# Patient Record
Sex: MALE | Race: WHITE | NOT HISPANIC OR LATINO | Employment: FULL TIME | URBAN - METROPOLITAN AREA
[De-identification: names, ages, dates, MRNs, and addresses within clinical notes are randomized per-mention and may not be internally consistent; named-entity substitution may affect disease eponyms.]

---

## 2017-02-08 ENCOUNTER — NON-PROVIDER VISIT (OUTPATIENT)
Dept: URGENT CARE | Facility: CLINIC | Age: 42
End: 2017-02-08

## 2017-02-08 DIAGNOSIS — Z02.1 PRE-EMPLOYMENT DRUG SCREENING: ICD-10-CM

## 2017-02-08 LAB
AMP AMPHETAMINE: NORMAL
COC COCAINE: NORMAL
INT CON NEG: NORMAL
INT CON POS: NORMAL
MET METHAMPHETAMINES: NORMAL
OPI OPIATES: NORMAL
PCP PHENCYCLIDINE: NORMAL
POC DRUG COMMENT 753798-OCCUPATIONAL HEALTH: NEGATIVE
THC: NORMAL

## 2017-02-08 PROCEDURE — 80305 DRUG TEST PRSMV DIR OPT OBS: CPT | Performed by: PHYSICIAN ASSISTANT

## 2018-03-07 ENCOUNTER — HOSPITAL ENCOUNTER (OUTPATIENT)
Dept: RADIOLOGY | Facility: MEDICAL CENTER | Age: 43
End: 2018-03-07
Attending: FAMILY MEDICINE
Payer: COMMERCIAL

## 2018-03-07 DIAGNOSIS — R51.9 NONINTRACTABLE HEADACHE, UNSPECIFIED CHRONICITY PATTERN, UNSPECIFIED HEADACHE TYPE: ICD-10-CM

## 2018-03-07 PROCEDURE — 70551 MRI BRAIN STEM W/O DYE: CPT

## 2018-07-13 ENCOUNTER — APPOINTMENT (OUTPATIENT)
Dept: ADMISSIONS | Facility: MEDICAL CENTER | Age: 43
End: 2018-07-13
Attending: OTOLARYNGOLOGY
Payer: COMMERCIAL

## 2018-07-13 ENCOUNTER — HOSPITAL ENCOUNTER (OUTPATIENT)
Dept: RADIOLOGY | Facility: MEDICAL CENTER | Age: 43
End: 2018-07-13

## 2018-07-13 RX ORDER — AMOXICILLIN AND CLAVULANATE POTASSIUM 875; 125 MG/1; MG/1
1 TABLET, FILM COATED ORAL 2 TIMES DAILY
COMMUNITY

## 2018-07-13 RX ORDER — ACETAMINOPHEN 325 MG/1
325 TABLET ORAL EVERY 6 HOURS PRN
COMMUNITY

## 2018-07-17 ENCOUNTER — HOSPITAL ENCOUNTER (OUTPATIENT)
Facility: MEDICAL CENTER | Age: 43
End: 2018-07-17
Attending: OTOLARYNGOLOGY | Admitting: OTOLARYNGOLOGY
Payer: COMMERCIAL

## 2018-07-17 VITALS
BODY MASS INDEX: 27.9 KG/M2 | HEART RATE: 56 BPM | OXYGEN SATURATION: 96 % | RESPIRATION RATE: 16 BRPM | TEMPERATURE: 97.8 F | HEIGHT: 71 IN | WEIGHT: 199.3 LBS

## 2018-07-17 DIAGNOSIS — G89.18 ACUTE POST-OPERATIVE PAIN: ICD-10-CM

## 2018-07-17 PROCEDURE — 160009 HCHG ANES TIME/MIN: Performed by: OTOLARYNGOLOGY

## 2018-07-17 PROCEDURE — 88311 DECALCIFY TISSUE: CPT

## 2018-07-17 PROCEDURE — 160041 HCHG SURGERY MINUTES - EA ADDL 1 MIN LEVEL 4: Performed by: OTOLARYNGOLOGY

## 2018-07-17 PROCEDURE — 700111 HCHG RX REV CODE 636 W/ 250 OVERRIDE (IP)

## 2018-07-17 PROCEDURE — 501838 HCHG SUTURE GENERAL: Performed by: OTOLARYNGOLOGY

## 2018-07-17 PROCEDURE — 700101 HCHG RX REV CODE 250

## 2018-07-17 PROCEDURE — 160029 HCHG SURGERY MINUTES - 1ST 30 MINS LEVEL 4: Performed by: OTOLARYNGOLOGY

## 2018-07-17 PROCEDURE — 700102 HCHG RX REV CODE 250 W/ 637 OVERRIDE(OP)

## 2018-07-17 PROCEDURE — 502573 HCHG PACK, ENT: Performed by: OTOLARYNGOLOGY

## 2018-07-17 PROCEDURE — A9270 NON-COVERED ITEM OR SERVICE: HCPCS

## 2018-07-17 PROCEDURE — 160048 HCHG OR STATISTICAL LEVEL 1-5: Performed by: OTOLARYNGOLOGY

## 2018-07-17 PROCEDURE — 160002 HCHG RECOVERY MINUTES (STAT): Performed by: OTOLARYNGOLOGY

## 2018-07-17 PROCEDURE — 160036 HCHG PACU - EA ADDL 30 MINS PHASE I: Performed by: OTOLARYNGOLOGY

## 2018-07-17 PROCEDURE — 88305 TISSUE EXAM BY PATHOLOGIST: CPT

## 2018-07-17 PROCEDURE — 502692 HCHG DRESSING, NASOPORE 8CM: Performed by: OTOLARYNGOLOGY

## 2018-07-17 PROCEDURE — 110454 HCHG SHELL REV 250: Performed by: OTOLARYNGOLOGY

## 2018-07-17 PROCEDURE — 500331 HCHG COTTONOID, SURG PATTIE: Performed by: OTOLARYNGOLOGY

## 2018-07-17 PROCEDURE — 160035 HCHG PACU - 1ST 60 MINS PHASE I: Performed by: OTOLARYNGOLOGY

## 2018-07-17 RX ORDER — LIDOCAINE HYDROCHLORIDE AND EPINEPHRINE 10; 10 MG/ML; UG/ML
INJECTION, SOLUTION INFILTRATION; PERINEURAL
Status: DISCONTINUED | OUTPATIENT
Start: 2018-07-17 | End: 2018-07-17 | Stop reason: HOSPADM

## 2018-07-17 RX ORDER — AMOXICILLIN AND CLAVULANATE POTASSIUM 875; 125 MG/1; MG/1
1 TABLET, FILM COATED ORAL 2 TIMES DAILY
Qty: 28 TAB | Refills: 0 | Status: SHIPPED | OUTPATIENT
Start: 2018-07-17 | End: 2018-07-31

## 2018-07-17 RX ORDER — SODIUM CHLORIDE, SODIUM LACTATE, POTASSIUM CHLORIDE, CALCIUM CHLORIDE 600; 310; 30; 20 MG/100ML; MG/100ML; MG/100ML; MG/100ML
INJECTION, SOLUTION INTRAVENOUS CONTINUOUS
Status: DISCONTINUED | OUTPATIENT
Start: 2018-07-17 | End: 2018-07-17 | Stop reason: HOSPADM

## 2018-07-17 RX ORDER — OXYCODONE HYDROCHLORIDE 5 MG/1
5 TABLET ORAL EVERY 4 HOURS PRN
Qty: 18 TAB | Refills: 0 | Status: SHIPPED | OUTPATIENT
Start: 2018-07-17 | End: 2018-07-20

## 2018-07-17 RX ORDER — GINSENG 100 MG
CAPSULE ORAL
Status: DISCONTINUED | OUTPATIENT
Start: 2018-07-17 | End: 2018-07-17 | Stop reason: HOSPADM

## 2018-07-17 RX ORDER — OXYMETAZOLINE HYDROCHLORIDE 0.05 G/100ML
SPRAY NASAL
Status: DISCONTINUED | OUTPATIENT
Start: 2018-07-17 | End: 2018-07-17 | Stop reason: HOSPADM

## 2018-07-17 RX ADMIN — SODIUM CHLORIDE, SODIUM LACTATE, POTASSIUM CHLORIDE, CALCIUM CHLORIDE: 600; 310; 30; 20 INJECTION, SOLUTION INTRAVENOUS at 09:25

## 2018-07-17 ASSESSMENT — PAIN SCALES - GENERAL
PAINLEVEL_OUTOF10: 0

## 2018-07-17 NOTE — DISCHARGE INSTRUCTIONS
ACTIVITY: Rest and take it easy for the first 24 hours.  A responsible adult is recommended to remain with you during that time.  It is normal to feel sleepy.  We encourage you to not do anything that requires balance, judgment or coordination.    MILD FLU-LIKE SYMPTOMS ARE NORMAL. YOU MAY EXPERIENCE GENERALIZED MUSCLE ACHES, THROAT IRRITATION, HEADACHE AND/OR SOME NAUSEA.    FOR 24 HOURS DO NOT:  Drive, operate machinery or run household appliances.  Drink beer or alcoholic beverages.   Make important decisions or sign legal documents.    SPECIAL INSTRUCTIONS: Diet as tolerated, no strenuous activity, may shower in 24 h.  Afrin 3 sprays to affected nostril prn heavy bleeding.  Follow up:10 days-please call for appointment.    DIET: To avoid nausea, slowly advance diet as tolerated, avoiding spicy or greasy foods for the first day.  Add more substantial food to your diet according to your physician's instructions.  Babies can be fed formula or breast milk as soon as they are hungry.  INCREASE FLUIDS AND FIBER TO AVOID CONSTIPATION.      FOLLOW-UP APPOINTMENT:  A follow-up appointment should be arranged with your doctor in 10 days; call to schedule.    You should CALL YOUR PHYSICIAN if you develop:  Fever greater than 101 degrees F.  Pain not relieved by medication, or persistent nausea or vomiting.  Excessive bleeding (blood soaking through dressing) or unexpected drainage from the wound.  Extreme redness or swelling around the incision site, drainage of pus or foul smelling drainage.  Inability to urinate or empty your bladder within 8 hours.  Problems with breathing or chest pain.    You should call 911 if you develop problems with breathing or chest pain.  If you are unable to contact your doctor or surgical center, you should go to the nearest emergency room or urgent care center.  Physician's telephone #: Dr. Longoria [phone]    If any questions arise, call your doctor.  If your doctor is not available,  please feel free to call the Surgical Center at [phone].  The Center is open Monday through Friday from 7AM to 7PM.  You can also call the HEALTH HOTLINE open 24 hours/day, 7 days/week and speak to a nurse at [phone], or toll free at [phone].    A registered nurse may call you a few days after your surgery to see how you are doing after your procedure.    MEDICATIONS: Resume taking daily medication.  Take prescribed pain medication with food.  If no medication is prescribed, you may take non-aspirin pain medication if needed.  PAIN MEDICATION CAN BE VERY CONSTIPATING.  Take a stool softener or laxative such as senokot, pericolace, or milk of magnesia if needed.    Prescription given for antibiotic and pain medication.     If your physician has prescribed pain medication that includes Acetaminophen (Tylenol), do not take additional Acetaminophen (Tylenol) while taking the prescribed medication.    Depression / Suicide Risk    As you are discharged from this [facility] facility, it is important to learn how to keep safe from harming yourself.    Recognize the warning signs:  · Abrupt changes in personality, positive or negative- including increase in energy   · Giving away possessions  · Change in eating patterns- significant weight changes-  positive or negative  · Change in sleeping patterns- unable to sleep or sleeping all the time   · Unwillingness or inability to communicate  · Depression  · Unusual sadness, discouragement and loneliness  · Talk of wanting to die  · Neglect of personal appearance   · Rebelliousness- reckless behavior  · Withdrawal from people/activities they love  · Confusion- inability to concentrate     If you or a loved one observes any of these behaviors or has concerns about self-harm, here's what you can do:  · Talk about it- your feelings and reasons for harming yourself  · Remove any means that you might use to hurt yourself (examples: pills, rope, extension cords,  firearm)  · Get professional help from the community (Mental Health, Substance Abuse, psychological counseling)  · Do not be alone:Call your Safe Contact- someone whom you trust who will be there for you.  · Call your local CRISIS HOTLINE 326-3522 or 468-052-4871  · Call your local Children's Mobile Crisis Response Team [place] (340) 678-8232 or www.CareFamily  · Call the toll free National Suicide Prevention Hotlines   · National Suicide Prevention Lifeline 830-499-LATL (6224)  · National Hope Line Network 800-SUICIDE (667-5767)

## 2018-07-17 NOTE — OR SURGEON
Immediate Post OP Note    PreOp Diagnosis: Chronic left ethmoid and maxillary sinusitis    PostOp Diagnosis: same    Procedure(s):  ANTROSTOMY - ENDOSCOPIC MAXILLARY W/MAXILLARY TISSUE REMOVAL - Wound Class: Clean Contaminated  ETHMOIDECTOMY - PARTIAL (ANTERIOR) - Wound Class: Clean Contaminated    Surgeon(s):  Sergio Longoria M.D.    Anesthesiologist/Type of Anesthesia:  Anesthesiologist: Barrington Castillo/General    Surgical Staff:  Circulator: Tessy Rosales R.N.  Relief Circulator: Xuan Mendoza R.N.  Scrub Person: Marium Harden    Specimens removed if any:  * No specimens in log *    Estimated Blood Loss: 100 cc    Findings: pus left maxillary sinus    Complications: none        7/17/2018 12:47 PM Sergio Longoria M.D.

## 2018-07-17 NOTE — OR NURSING
Discharge orders received. Meets criteria, declines pain/nausea. All lines and monitors discontinued. Reviewed discharge paperwork with pt and wife. Discussed diet, activity, medications, follow up care and worsening symptoms. No questions at this time. Pt to be discharged to home via private vehicle. Offered hospital escort and wc, pt declined, ambulated out of department.

## 2018-07-17 NOTE — OR NURSING
Pt to PACU from OR. Report from anesthesia and OR RN. Pt sleeping at this time. Respirations even and unlabored. VSS

## 2018-07-24 NOTE — OP REPORT
DATE OF SERVICE:  07/17/2018    PREOPERATIVE DIAGNOSES:  1.  Chronic left maxillary sinusitis.  2.  Chronic left ethmoid sinusitis.  3.  Left-sided maxillary and ethmoid odontogenic sinusitis.    POSTOPERATIVE DIAGNOSES:  1.  Chronic left maxillary sinusitis.  2.  Chronic left ethmoid sinusitis.  3.  Left-sided maxillary and ethmoid odontogenic sinusitis.    PROCEDURES:  1.  Left endoscopic anterior ethmoidectomy with landmarks.  2.  Left endoscopic maxillary sinus antrostomy with landmark.    INDICATIONS:  The patient is a 42-year-old male who has had a 3-year history   of left sinus issues since extraction of tooth.  He has pain in the left   frontal region and drainage.  A recent MRI done for headaches demonstrated a   completely opacified left maxillary sinus extending into the anterior   ethmoids.  CT scanning confirmed the maxillary and ethmoid process and   dehiscent bone  is maxillary sinus from the oral cavity in the   region of the tooth removal.  The patient has an odontogenic sinusitis and   presents now for surgical treatment.    DESCRIPTION OF PROCEDURE:  The patient was taken to the operating room and   placed in the supine position.  General anesthesia was induced and the patient   easily intubated.  The nasal cavity was packed with Afrin-soaked sponges and   the patient draped in the usual fashion for this type of procedure including   setting up the landmarks tracking system.  The patient was then registered   with excellent accuracy.  The Afrin-soaked sponges were removed.  The patient   had a septal deviation, but it was to the right side and actually access into   the middle meatus was excellent.  He was having no breathing issues, so it was   decided to forego the septoplasty.  A seeker instrument was placed behind the   patient's uncinate process and used to retract it anteriorly.  It was then   removed with upbiting non-serrated forceps and the shaver blade.  The seeker   was used  as well to tease out all of the uncinate bone.  An anterior   ethmoidectomy was then accomplished.  The patient had moderate mucoperiosteal   thickening in the anterior ethmoid.  The procedure was done with landmarks   assessing location and there was no injury to the lamina papyracea or fovea   ethmoidalis.  The resection actually proceeded through the posterior lamella   of the bulla into the posterior ethmoids, but no dissection was done in this   area.  There was pus streaming from the natural ostia of the maxillary sinus.    The area where the pus was coming from was enlarged using side biting and   backbiting forceps.  Purulence was evacuated, but not cultured.  The maxillary   sinus was then vigorously irrigated with saline and the irrigant suctioned.    The middle turbinate appeared to be quite stable.  Surgiflo was placed in the   middle meatus, then a small piece of Nasopore coated in mupirocin.  The oral   cavity was suctioned free of blood and the patient awakened and taken to the   recovery room in stable condition.  He tolerated the procedure well and there   were no complications.       ____________________________________     MD CELIA COTO / JOHN    DD:  07/24/2018 11:05:52  DT:  07/24/2018 11:19:50    D#:  [id number]  Job#:  [id number]    cc: Dr. Philip Gómez

## 2018-10-29 ENCOUNTER — OFFICE VISIT (OUTPATIENT)
Dept: URGENT CARE | Facility: CLINIC | Age: 43
End: 2018-10-29

## 2018-10-29 DIAGNOSIS — Z01.89 RESPIRATORY CLEARANCE EXAMINATION, ENCOUNTER FOR: ICD-10-CM

## 2018-10-29 PROCEDURE — 8916 PR CLEARANCE ONLY: Performed by: NURSE PRACTITIONER

## 2018-10-29 NOTE — PROGRESS NOTES
Present for mask fit and respiratory clearance. Reviewed respiratory clearance.  Patient is cleared for mask.

## 2024-05-12 ENCOUNTER — OFFICE VISIT (OUTPATIENT)
Dept: URGENT CARE | Facility: PHYSICIAN GROUP | Age: 49
End: 2024-05-12
Payer: COMMERCIAL

## 2024-05-12 ENCOUNTER — APPOINTMENT (OUTPATIENT)
Dept: RADIOLOGY | Facility: IMAGING CENTER | Age: 49
End: 2024-05-12
Attending: NURSE PRACTITIONER
Payer: COMMERCIAL

## 2024-05-12 VITALS
HEART RATE: 67 BPM | RESPIRATION RATE: 15 BRPM | SYSTOLIC BLOOD PRESSURE: 132 MMHG | OXYGEN SATURATION: 97 % | TEMPERATURE: 96.4 F | WEIGHT: 200 LBS | BODY MASS INDEX: 28.63 KG/M2 | DIASTOLIC BLOOD PRESSURE: 68 MMHG | HEIGHT: 70 IN

## 2024-05-12 DIAGNOSIS — M25.471 PAIN AND SWELLING OF RIGHT ANKLE: ICD-10-CM

## 2024-05-12 DIAGNOSIS — M25.571 PAIN AND SWELLING OF RIGHT ANKLE: ICD-10-CM

## 2024-05-12 DIAGNOSIS — S93.401A SPRAIN OF RIGHT ANKLE, UNSPECIFIED LIGAMENT, INITIAL ENCOUNTER: ICD-10-CM

## 2024-05-12 PROCEDURE — 3078F DIAST BP <80 MM HG: CPT | Performed by: NURSE PRACTITIONER

## 2024-05-12 PROCEDURE — 99203 OFFICE O/P NEW LOW 30 MIN: CPT | Performed by: NURSE PRACTITIONER

## 2024-05-12 PROCEDURE — 3075F SYST BP GE 130 - 139MM HG: CPT | Performed by: NURSE PRACTITIONER

## 2024-05-12 PROCEDURE — 73610 X-RAY EXAM OF ANKLE: CPT | Mod: TC,FY,RT | Performed by: RADIOLOGY

## 2024-05-12 RX ORDER — ACETAMINOPHEN 500 MG
1000 TABLET ORAL ONCE
Status: COMPLETED | OUTPATIENT
Start: 2024-05-12 | End: 2024-05-12

## 2024-05-12 RX ADMIN — Medication 1000 MG: at 12:47

## 2024-05-12 ASSESSMENT — ENCOUNTER SYMPTOMS
FALLS: 1
SPEECH CHANGE: 0
FOCAL WEAKNESS: 1
TINGLING: 0

## 2024-05-12 NOTE — PROGRESS NOTES
"Subjective:     Ritchie Flores Jr. is a 48 y.o. male who presents for Ankle Injury (Rolled on rock. Right Ankle pain x 1 day)      Ankle Injury   Pertinent negatives include no tingling.     Pt presents for evaluation of a new problem. Francisco is a very pleasant 48-year-old male presents to urgent care today with complaints of right-sided lateral ankle pain that started yesterday after he rolled his ankle on a rock.  Patient denies any injury to the this ankle previously.  His pain does radiate up to left lower leg above ankle.  He denies any numbness or tingling.  He has used ibuprofen for relief of his discomfort.    Review of Systems   Musculoskeletal:  Positive for falls and joint pain.   Neurological:  Positive for focal weakness. Negative for tingling and speech change.       PMH: No past medical history on file.  ALLERGIES: No Known Allergies  SURGHX:   Past Surgical History:   Procedure Laterality Date    ANTROSTOMY Left 7/17/2018    Procedure: ANTROSTOMY - ENDOSCOPIC MAXILLARY W/MAXILLARY TISSUE REMOVAL;  Surgeon: Sergio Longoria M.D.;  Location: SURGERY SAME DAY [facility];  Service: Ent    ETHMOIDECTOMY Left 7/17/2018    Procedure: ETHMOIDECTOMY - PARTIAL (ANTERIOR);  Surgeon: Sergio Longoria M.D.;  Location: SURGERY SAME DAY [facility];  Service: Ent    OTHER ORTHOPEDIC SURGERY  1998    hand surgery     SOCHX:   Social History     Socioeconomic History    Marital status:    Tobacco Use    Smoking status: Never    Smokeless tobacco: Never   Substance and Sexual Activity    Alcohol use: Yes     Comment: 1 drink a week     FH: No family history on file.      Objective:   /68   Pulse 67   Temp (!) 35.8 °C (96.4 °F) (Temporal)   Resp 15   Ht 1.778 m (5' 10\")   Wt 90.7 kg (200 lb)   SpO2 97%   BMI 28.70 kg/m²     Physical Exam  Vitals and nursing note reviewed.   Constitutional:       General: He is not in acute distress.     Appearance: Normal appearance. He is normal weight. He is " not ill-appearing or toxic-appearing.   HENT:      Head: Normocephalic.      Right Ear: External ear normal.      Left Ear: External ear normal.      Nose: Nose normal.      Mouth/Throat:      Mouth: Mucous membranes are moist.   Eyes:      General:         Right eye: No discharge.         Left eye: No discharge.      Extraocular Movements: Extraocular movements intact.      Conjunctiva/sclera: Conjunctivae normal.      Pupils: Pupils are equal, round, and reactive to light.   Pulmonary:      Effort: Pulmonary effort is normal.      Breath sounds: Normal breath sounds.   Abdominal:      General: Abdomen is flat.   Musculoskeletal:         General: Normal range of motion.      Cervical back: Normal range of motion and neck supple. No rigidity.   Lymphadenopathy:      Cervical: No cervical adenopathy.   Skin:     General: Skin is warm and dry.   Neurological:      General: No focal deficit present.      Mental Status: He is alert and oriented to person, place, and time. Mental status is at baseline.   Psychiatric:         Mood and Affect: Mood normal.         Behavior: Behavior normal.         Judgment: Judgment normal.       DX-ANKLE 3+ VIEWS RIGHT    Result Date: 5/12/2024 5/12/2024 12:45 PM HISTORY/REASON FOR EXAM:  Pain/Deformity Following Trauma. Right ankle pain, injury TECHNIQUE/EXAM DESCRIPTION AND NUMBER OF VIEWS:  3 views of the RIGHT ankle. COMPARISON: None. FINDINGS: There are no acute fracture. There is deformity of the distal fibula and adjacent talus suggesting sequela of prior injury. There is also probably an old medial malleolar fracture. There is spurring at the insertion of achilles' tendon and origin of plantar fascia. Alignment is normal. No degenerative changes are present.     1.  Negative for acute fracture 2.  Sequela of prior injury involving the distal fibula, lateral aspect of the talus, probably the medial malleolus 3.  Calcaneal spurring     Assessment/Plan:   Assessment        1. Pain  and swelling of right ankle  DX-ANKLE 3+ VIEWS RIGHT    acetaminophen (Tylenol) tablet 1,000 mg      2. Sprain of right ankle, unspecified ligament, initial encounter          Pt was encouraged to seek treatment back in urgent care for follow-up x-ray if pain remains persistent past 7 days or develops worsening pain/symptoms. Tylenol/Ibuprofen as needed for discomfort.  Pt was encouraged to rest, ice 20 minutes 3 times daily for 3 days, elevate at heart level and compress with Ace wrap. Ace wrap applied in clinic by MA. Gentle ROM exercises encouraged.  Questions/concerns addressed.  AVS handout given and reviewed with patient. Pt educated on red flags and when to seek treatment back in ER or UC.

## 2024-05-17 ENCOUNTER — OFFICE VISIT (OUTPATIENT)
Dept: URGENT CARE | Facility: PHYSICIAN GROUP | Age: 49
End: 2024-05-17
Payer: COMMERCIAL

## 2024-05-17 ENCOUNTER — TELEPHONE (OUTPATIENT)
Dept: URGENT CARE | Facility: PHYSICIAN GROUP | Age: 49
End: 2024-05-17

## 2024-05-17 VITALS
OXYGEN SATURATION: 97 % | TEMPERATURE: 97.4 F | RESPIRATION RATE: 16 BRPM | HEART RATE: 72 BPM | BODY MASS INDEX: 29.63 KG/M2 | DIASTOLIC BLOOD PRESSURE: 84 MMHG | WEIGHT: 207 LBS | SYSTOLIC BLOOD PRESSURE: 138 MMHG | HEIGHT: 70 IN

## 2024-05-17 DIAGNOSIS — S93.401A SPRAIN OF RIGHT ANKLE, UNSPECIFIED LIGAMENT, INITIAL ENCOUNTER: ICD-10-CM

## 2024-05-17 PROCEDURE — 99213 OFFICE O/P EST LOW 20 MIN: CPT

## 2024-05-17 PROCEDURE — 3075F SYST BP GE 130 - 139MM HG: CPT

## 2024-05-17 PROCEDURE — 3079F DIAST BP 80-89 MM HG: CPT

## 2024-05-17 ASSESSMENT — ENCOUNTER SYMPTOMS
NUMBNESS: 0
INABILITY TO BEAR WEIGHT: 0
TINGLING: 0
MUSCLE WEAKNESS: 0
LOSS OF SENSATION: 0
LOSS OF MOTION: 0

## 2024-05-17 NOTE — LETTER
May 17, 2024    To Whom It May Concern:         This is confirmation that Ritchie Snow Sandra Cohen attended his scheduled appointment with ROSY Ferrell on 5/17/24. Please excuse him from work from 5/14/24 - 5/17/24 due to an acute injury. He may return to work on 5/21/24.         If you have any questions please do not hesitate to call me at the phone number listed below.    Sincerely,          Lina Blake A.P.R.N.  [phone]

## 2024-05-17 NOTE — PROGRESS NOTES
Subjective:   Ritchie Flores Jr. is a very pleasant 48 y.o. male who presents for:    Chief Complaint   Patient presents with    Ankle Injury     (R) on 05-11-24.  Pt. Was seen on 05-12-24 and needs a work note.        HPI:    Ankle Injury   Incident onset: the patient sustained an ankle injury on 5/11/24.  He was seen in the urgent care and diagnosed with a right ankle sprain.  Negative x-rays. The quality of the pain is described as aching. Pain course: His pain is improving with supportive measures.  He is able to bear weight on the affected foot and has mild pain on the lateral aspect of the ankle with movement. Pertinent negatives include no inability to bear weight, loss of motion, loss of sensation, muscle weakness, numbness or tingling. He has tried ice and elevation (Tylenol and ibuprofen, ankle brace) for the symptoms. The treatment provided moderate relief.     He would like a work note excusing him from work for the past week due to his injury.    ROS:    Review of Systems   Musculoskeletal:         Right ankle injury   Neurological:  Negative for tingling and numbness.   All other systems reviewed and are negative.      Medications:      Current Outpatient Medications   Medication Sig    amoxicillin-clavulanate (AUGMENTIN) 875-125 MG Tab Take 1 Tab by mouth 2 times a day. (Patient not taking: Reported on 5/12/2024)    acetaminophen (TYLENOL) 325 MG Tab Take 325 mg by mouth every 6 hours as needed. (Patient not taking: Reported on 5/12/2024)       Allergies:     No Known Allergies    Problem List:     There is no problem list on file for this patient.      Surgical History:    Past Surgical History:   Procedure Laterality Date    ANTROSTOMY Left 7/17/2018    Procedure: ANTROSTOMY - ENDOSCOPIC MAXILLARY W/MAXILLARY TISSUE REMOVAL;  Surgeon: Sergio Longoria M.D.;  Location: SURGERY SAME DAY [facility];  Service: Ent    ETHMOIDECTOMY Left 7/17/2018    Procedure: ETHMOIDECTOMY - PARTIAL (ANTERIOR);   Surgeon: Sergio Longoria M.D.;  Location: SURGERY SAME DAY [facility];  Service: Ent    OTHER ORTHOPEDIC SURGERY  1998    hand surgery       Past Social Hx:     Social History     Socioeconomic History    Marital status:    Tobacco Use    Smoking status: Never    Smokeless tobacco: Never   Substance and Sexual Activity    Alcohol use: Yes     Comment: 1 drink a week        Past Family Hx:      History reviewed. No pertinent family history.    Problem list, medications, and allergies reviewed by myself today in Epic.     Objective:     Vitals:    05/17/24 0916   BP: 138/84   Pulse: 72   Resp: 16   Temp: 36.3 °C (97.4 °F)   SpO2: 97%       Physical Exam  Vitals reviewed.   Constitutional:       General: He is not in acute distress.     Appearance: Normal appearance. He is not ill-appearing, toxic-appearing or diaphoretic.   HENT:      Head: Normocephalic and atraumatic.   Eyes:      Extraocular Movements: Extraocular movements intact.      Pupils: Pupils are equal, round, and reactive to light.   Cardiovascular:      Pulses: Normal pulses.      Heart sounds: Normal heart sounds.   Pulmonary:      Effort: Pulmonary effort is normal.   Musculoskeletal:         General: Normal range of motion.      Cervical back: Normal range of motion.        Feet:    Feet:      Comments: Right ankle/foot:  Appearance: No bruising, erythema, or deformity appreciated  Palpation: Mild TTP over the lateral malleolus and the anterior lateral aspect of the right ankle proximal to the lateral malleolus.  No TTP along medial malleolus or deltoid ligament.  ATFL, CFL, or PTFL.  No TTP along midfoot, base of the 5th metatarsal, MTP joints, or toes.   ROM: FROM throughout  Strength: 5/5 throughout  Neurovascular: 2+ dorsalis pedis and posterior tibial.  Sensation intact and equal bilaterally  Gait: Nonantalgic, neutral stance without pes planus     Skin:     General: Skin is warm and dry.   Neurological:      General: No focal deficit  present.      Mental Status: He is alert and oriented to person, place, and time. Mental status is at baseline.   Psychiatric:         Mood and Affect: Mood normal.         Behavior: Behavior normal.         Thought Content: Thought content normal.         Judgment: Judgment normal.                 Assessment/Plan:     Diagnosis and associated orders:     1. Sprain of right ankle, unspecified ligament, initial encounter          Comments/MDM:     Presentation consistent with sprain of the right ankle.  His symptoms are improving with the use of an ankle brace, home physical therapy, Tylenol/ibuprofen, elevation, and ice.  X-rays completed in urgent care setting at previous appointment negative for osseous abnormality  Discussed return to work precautions.  Work note provided.  He was given an Ace wrap and educated on how to wrap his ankle.  Supportive measures reviewed at length regarding rehabilitation of an ankle sprain.  Return precautions discussed         All questions answered. Patient verbalized understanding and is in agreement with this plan of care.     If symptoms are worsening or not improving in 3-5 days, follow-up with PCP or return to UC. Differential diagnosis, natural history, and supportive care discussed. AVS handout given and reviewed with patient. Patient educated on red flags and when to seek treatment back in ED or UC.     I personally reviewed prior external notes and test results pertinent to today's visit.  I have independently reviewed and interpreted all diagnostics ordered during this urgent care visit.     This dictation has been created using voice recognition software. The accuracy of the dictation is limited by the abilities of the software. I expect there may be some errors of grammar and possibly content. I made every attempt to manually correct the errors within my dictation. However, errors related to voice recognition software may still exist and should be interpreted within the  appropriate context.    This note was electronically signed by ROSY Sumner

## (undated) DEVICE — PATTIES SURG X-RAYCOTTONOID - 1/2 X 3 IN (200/CA)

## (undated) DEVICE — NEEDLE SPINAL NON-SAFETY 25GA X 3 (25EA/BX)"

## (undated) DEVICE — GLOVE BIOGEL PI INDICATOR SZ 6.5 SURGICAL PF LF - (50/BX 4BX/CA)

## (undated) DEVICE — SODIUM CHL IRRIGATION 0.9% 1000ML (12EA/CA)

## (undated) DEVICE — SUTURE GENERAL

## (undated) DEVICE — ANTI-FOG SOLUTION - 60BTL/CA

## (undated) DEVICE — GOWN WARMING STANDARD FLEX - (30/CA)

## (undated) DEVICE — DRESSING NASOPORE 8CM STD - (8EA/PK)

## (undated) DEVICE — TUBE E-T HI-LO CUFF 7.0MM (10EA/PK)

## (undated) DEVICE — TRACKER ENT PATIENT

## (undated) DEVICE — GLOVE BIOGEL SZ 7.5 SURGICAL PF LTX - (50PR/BX 4BX/CA)

## (undated) DEVICE — TRACKER ENT INSTRUMENT

## (undated) DEVICE — CANISTER SUCTION 3000ML MECHANICAL FILTER AUTO SHUTOFF MEDI-VAC NONSTERILE LF DISP  (40EA/CA)

## (undated) DEVICE — HEAD HOLDER JUNIOR/ADULT

## (undated) DEVICE — SET LEADWIRE 5 LEAD BEDSIDE DISPOSABLE ECG (1SET OF 5/EA)

## (undated) DEVICE — TUBE CONNECTING SUCTION - CLEAR PLASTIC STERILE 72 IN (50EA/CA)

## (undated) DEVICE — SUCTION INSTRUMENT YANKAUER BULBOUS TIP W/O VENT (50EA/CA)

## (undated) DEVICE — TUBING CLEARLINK DUO-VENT - C-FLO (48EA/CA)

## (undated) DEVICE — SENSOR SPO2 NEO LNCS ADHESIVE (20/BX) SEE USER NOTES

## (undated) DEVICE — PROTECTOR ULNA NERVE - (36PR/CA)

## (undated) DEVICE — ELECTRODE 850 FOAM ADHESIVE - HYDROGEL RADIOTRNSPRNT (50/PK)

## (undated) DEVICE — GLOVE BIOGEL SZ 7 SURGICAL PF LTX - (50PR/BX 4BX/CA)

## (undated) DEVICE — PACK ENT OR - (2EA/CA)

## (undated) DEVICE — CATHETER IV 20 GA X 1-1/4 ---SURG.& SDS ONLY--- (50EA/BX)

## (undated) DEVICE — KIT SURGIFLO W/OUT THROMBIN - (6EA/CA)

## (undated) DEVICE — CANISTER SUCTION RIGID RED 1500CC (40EA/CA)

## (undated) DEVICE — ELECTRODE DUAL RETURN W/ CORD - (50/PK)

## (undated) DEVICE — SUTURE 3-0 ETHILON FS-1 - (36/BX) 30 INCH

## (undated) DEVICE — KIT ANESTHESIA W/CIRCUIT & 3/LT BAG W/FILTER (20EA/CA)

## (undated) DEVICE — LACTATED RINGERS INJ 1000 ML - (14EA/CA 60CA/PF)

## (undated) DEVICE — MASK ANESTHESIA ADULT  - (100/CA)

## (undated) DEVICE — KIT  I.V. START (100EA/CA)